# Patient Record
Sex: FEMALE | Race: WHITE | NOT HISPANIC OR LATINO | URBAN - METROPOLITAN AREA
[De-identification: names, ages, dates, MRNs, and addresses within clinical notes are randomized per-mention and may not be internally consistent; named-entity substitution may affect disease eponyms.]

---

## 2017-06-02 ENCOUNTER — OFFICE (OUTPATIENT)
Dept: URBAN - METROPOLITAN AREA CLINIC 33 | Facility: CLINIC | Age: 42
End: 2017-06-02
Payer: COMMERCIAL

## 2017-06-02 VITALS
DIASTOLIC BLOOD PRESSURE: 62 MMHG | HEIGHT: 65 IN | TEMPERATURE: 97.7 F | WEIGHT: 157 LBS | HEART RATE: 69 BPM | SYSTOLIC BLOOD PRESSURE: 90 MMHG

## 2017-06-02 DIAGNOSIS — K26.3 ACUTE DUODENAL ULCER WITHOUT HEMORRHAGE OR PERFORATION: ICD-10-CM

## 2017-06-02 DIAGNOSIS — R11.2 NAUSEA WITH VOMITING, UNSPECIFIED: ICD-10-CM

## 2017-06-02 DIAGNOSIS — F17.200 NICOTINE DEPENDENCE, UNSPECIFIED, UNCOMPLICATED: ICD-10-CM

## 2017-06-02 DIAGNOSIS — K21.0 GASTRO-ESOPHAGEAL REFLUX DISEASE WITH ESOPHAGITIS: ICD-10-CM

## 2017-06-02 PROCEDURE — 99214 OFFICE O/P EST MOD 30 MIN: CPT

## 2017-06-02 RX ORDER — OMEPRAZOLE 40 MG/1
80 CAPSULE, DELAYED RELEASE ORAL
Qty: 60 | Refills: 11 | Status: ACTIVE
Start: 2017-06-02

## 2017-06-02 RX ORDER — ONDANSETRON 4 MG/1
TABLET, ORALLY DISINTEGRATING ORAL
Qty: 60 | Refills: 1 | Status: COMPLETED
Start: 2017-06-02 | End: 2017-07-12

## 2017-06-02 NOTE — SERVICEHPINOTES
This patient with lap band is here to discuss recurrent acid reflux and cyclical vomiting. Lansoprazole 30 mg BID are not controlling symptoms. She feels acid starts these cycles of nausea and vomiting . She was reports constant nausea and vomiting for five days with excessive belching. Symptoms are worse at night  and she has tried sleeping upright. She has only been eating chicken soup and no caffeine. She has a lap band and wants it removed, as she feels this exacerbates her symptoms. She has epigastric pain ,2 of 10. She denies traveling, new medications, and has tried Pantoprazole , Nexium, Dexilant, Lansoprazole. She feels miserable.  Her last EGD in 2016 revealed mild chronic gastritis and mild esophagitis no Moore's on pathology despite the appearance. She has a long time smoker and smokes a half pack of cigarettes a day. In 2015, she had a duodenal ulcer on EGD.

## 2017-06-05 ENCOUNTER — OFFICE (OUTPATIENT)
Dept: URBAN - METROPOLITAN AREA CLINIC 32 | Facility: CLINIC | Age: 42
End: 2017-06-05
Payer: COMMERCIAL

## 2017-06-05 ENCOUNTER — INDEPENDENT LABORATORY (OUTPATIENT)
Dept: URBAN - METROPOLITAN AREA PATHOLOGY 17 | Facility: PATHOLOGY | Age: 42
End: 2017-06-05
Payer: COMMERCIAL

## 2017-06-05 VITALS
DIASTOLIC BLOOD PRESSURE: 67 MMHG | OXYGEN SATURATION: 98 % | TEMPERATURE: 99.1 F | HEART RATE: 58 BPM | HEART RATE: 68 BPM | SYSTOLIC BLOOD PRESSURE: 102 MMHG | WEIGHT: 157 LBS | RESPIRATION RATE: 12 BRPM | RESPIRATION RATE: 14 BRPM | HEIGHT: 65 IN | SYSTOLIC BLOOD PRESSURE: 104 MMHG | OXYGEN SATURATION: 94 % | DIASTOLIC BLOOD PRESSURE: 66 MMHG | TEMPERATURE: 99.3 F

## 2017-06-05 DIAGNOSIS — K21.0 GASTRO-ESOPHAGEAL REFLUX DISEASE WITH ESOPHAGITIS: ICD-10-CM

## 2017-06-05 DIAGNOSIS — R10.13 EPIGASTRIC PAIN: ICD-10-CM

## 2017-06-05 DIAGNOSIS — Z98.84 BARIATRIC SURGERY STATUS: ICD-10-CM

## 2017-06-05 DIAGNOSIS — R11.2 NAUSEA WITH VOMITING, UNSPECIFIED: ICD-10-CM

## 2017-06-05 PROCEDURE — 88312 SPECIAL STAINS GROUP 1: CPT

## 2017-06-05 PROCEDURE — 88305 TISSUE EXAM BY PATHOLOGIST: CPT

## 2017-06-05 PROCEDURE — 88313 SPECIAL STAINS GROUP 2: CPT

## 2017-06-05 PROCEDURE — 88342 IMHCHEM/IMCYTCHM 1ST ANTB: CPT

## 2017-06-05 PROCEDURE — 00740: CPT | Mod: AA,QS

## 2017-07-12 ENCOUNTER — OFFICE (OUTPATIENT)
Dept: URBAN - METROPOLITAN AREA CLINIC 33 | Facility: CLINIC | Age: 42
End: 2017-07-12
Payer: COMMERCIAL

## 2017-07-12 VITALS
SYSTOLIC BLOOD PRESSURE: 108 MMHG | TEMPERATURE: 97.9 F | DIASTOLIC BLOOD PRESSURE: 77 MMHG | WEIGHT: 160 LBS | HEART RATE: 68 BPM | HEIGHT: 65 IN

## 2017-07-12 DIAGNOSIS — K21.9 GASTRO-ESOPHAGEAL REFLUX DISEASE WITHOUT ESOPHAGITIS: ICD-10-CM

## 2017-07-12 DIAGNOSIS — R10.13 EPIGASTRIC PAIN: ICD-10-CM

## 2017-07-12 DIAGNOSIS — R14.2 ERUCTATION: ICD-10-CM

## 2017-07-12 PROCEDURE — 99214 OFFICE O/P EST MOD 30 MIN: CPT

## 2017-07-12 RX ORDER — LORAZEPAM 1 MG/1
TABLET ORAL
Qty: 30 | Refills: 0 | Status: COMPLETED
Start: 2017-07-12 | End: 2017-10-10

## 2017-07-12 NOTE — SERVICEHPINOTES
This 41 YO patient with lap band is here for follow up after EGD for acid reflux and cyclical vomiting. She still has some acid reflux on Omeprazole 40 mg BID . Her last acid reflux was in the middle of the night after eating a chocolate pretzel 12 hours prior. Her symptoms are stable but she is tired of intermittent symptoms.   Her EGD 6-2017 revealed erosive esophagitis on pathology, no H pylori. A white matter was seen in esophagus and stomach and some was able to be washed off . She has only vomited once or twice in the last month. She feels she would like to consider surgery for acid reflux. She has given up chocolate and sodas from her diet. She still drinks one coffee a day. She eats very healthfully  and does not eat late. She smokes one pack a day. She has tried Pantoprazole, Lansoprazole, Nexium, Dexilant, and is taking Omeprazole.

## 2017-10-10 ENCOUNTER — OFFICE (OUTPATIENT)
Dept: URBAN - METROPOLITAN AREA CLINIC 32 | Facility: CLINIC | Age: 42
End: 2017-10-10
Payer: COMMERCIAL

## 2017-10-10 ENCOUNTER — INDEPENDENT LABORATORY (OUTPATIENT)
Dept: URBAN - METROPOLITAN AREA PATHOLOGY 17 | Facility: PATHOLOGY | Age: 42
End: 2017-10-10
Payer: COMMERCIAL

## 2017-10-10 VITALS
SYSTOLIC BLOOD PRESSURE: 123 MMHG | DIASTOLIC BLOOD PRESSURE: 67 MMHG | RESPIRATION RATE: 16 BRPM | RESPIRATION RATE: 14 BRPM | HEART RATE: 74 BPM | SYSTOLIC BLOOD PRESSURE: 110 MMHG | TEMPERATURE: 97.2 F | SYSTOLIC BLOOD PRESSURE: 98 MMHG | SYSTOLIC BLOOD PRESSURE: 81 MMHG | SYSTOLIC BLOOD PRESSURE: 104 MMHG | HEART RATE: 67 BPM | OXYGEN SATURATION: 97 % | OXYGEN SATURATION: 95 % | DIASTOLIC BLOOD PRESSURE: 60 MMHG | RESPIRATION RATE: 13 BRPM | HEIGHT: 65 IN | OXYGEN SATURATION: 99 % | WEIGHT: 160 LBS | DIASTOLIC BLOOD PRESSURE: 46 MMHG | HEART RATE: 88 BPM | DIASTOLIC BLOOD PRESSURE: 61 MMHG | TEMPERATURE: 98.6 F | OXYGEN SATURATION: 90 %

## 2017-10-10 DIAGNOSIS — K21.0 GASTRO-ESOPHAGEAL REFLUX DISEASE WITH ESOPHAGITIS: ICD-10-CM

## 2017-10-10 DIAGNOSIS — K29.60 OTHER GASTRITIS WITHOUT BLEEDING: ICD-10-CM

## 2017-10-10 PROBLEM — K31.89 OTHER DISEASES OF STOMACH AND DUODENUM: Status: ACTIVE | Noted: 2017-10-10

## 2017-10-10 PROCEDURE — 91035 G-ESOPH REFLX TST W/ELECTROD: CPT

## 2017-10-10 PROCEDURE — 88305 TISSUE EXAM BY PATHOLOGIST: CPT

## 2017-10-10 PROCEDURE — 88313 SPECIAL STAINS GROUP 2: CPT

## 2017-10-10 PROCEDURE — 88342 IMHCHEM/IMCYTCHM 1ST ANTB: CPT

## 2017-10-30 ENCOUNTER — OFFICE (OUTPATIENT)
Dept: URBAN - METROPOLITAN AREA CLINIC 33 | Facility: CLINIC | Age: 42
End: 2017-10-30
Payer: COMMERCIAL

## 2017-10-30 VITALS
HEIGHT: 65 IN | SYSTOLIC BLOOD PRESSURE: 106 MMHG | DIASTOLIC BLOOD PRESSURE: 70 MMHG | WEIGHT: 142 LBS | HEART RATE: 73 BPM | TEMPERATURE: 97.2 F

## 2017-10-30 DIAGNOSIS — K29.70 GASTRITIS, UNSPECIFIED, WITHOUT BLEEDING: ICD-10-CM

## 2017-10-30 DIAGNOSIS — K21.0 GASTRO-ESOPHAGEAL REFLUX DISEASE WITH ESOPHAGITIS: ICD-10-CM

## 2017-10-30 PROCEDURE — 99214 OFFICE O/P EST MOD 30 MIN: CPT
